# Patient Record
Sex: FEMALE | URBAN - METROPOLITAN AREA
[De-identification: names, ages, dates, MRNs, and addresses within clinical notes are randomized per-mention and may not be internally consistent; named-entity substitution may affect disease eponyms.]

---
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
## 2020-01-17 NOTE — PROCEDURE: MIPS QUALITY
Quality 130: Documentation Of Current Medications In The Medical Record: Current Medications Documented
Female
Detail Level: Detailed

## 2020-05-12 NOTE — HPI: MEDICATION (ISOTRETINOIN)
Addended by: LITO YEAGER on: 5/12/2020 09:21 AM     Modules accepted: Orders    
How Severe Is It?: moderate
Is This A New Presentation, Or A Follow-Up?: Follow Up Isotretinoin

## 2024-01-02 NOTE — PROCEDURE: DEFER
Procedure To Be Performed At Next Visit: Excision
Other Procedure: 1.2 cm cyst on the back of neck
Detail Level: Detailed
February